# Patient Record
Sex: FEMALE | Race: WHITE | Employment: UNEMPLOYED | URBAN - METROPOLITAN AREA
[De-identification: names, ages, dates, MRNs, and addresses within clinical notes are randomized per-mention and may not be internally consistent; named-entity substitution may affect disease eponyms.]

---

## 2021-11-04 LAB — MAMMOGRAPHY, EXTERNAL: NORMAL

## 2022-04-18 PROBLEM — J30.9 ATOPIC RHINITIS: Status: ACTIVE | Noted: 2017-02-07

## 2022-04-18 PROBLEM — I10 HYPERTENSION: Status: ACTIVE | Noted: 2017-02-07

## 2022-04-18 PROBLEM — R74.01 ELEVATED ALANINE AMINOTRANSFERASE (ALT) LEVEL: Status: ACTIVE | Noted: 2017-02-07

## 2022-04-18 PROBLEM — E11.9 DIABETES MELLITUS (HCC): Status: ACTIVE | Noted: 2017-02-07

## 2022-04-18 PROBLEM — F41.1 GENERALIZED ANXIETY DISORDER: Status: ACTIVE | Noted: 2017-02-07

## 2022-04-18 PROBLEM — M76.61 ACHILLES TENDINITIS OF RIGHT LOWER EXTREMITY: Status: ACTIVE | Noted: 2021-08-20

## 2022-04-18 PROBLEM — E03.9 HYPOTHYROIDISM: Status: ACTIVE | Noted: 2017-02-07

## 2022-04-18 PROBLEM — E78.5 DYSLIPIDEMIA: Status: ACTIVE | Noted: 2017-02-07

## 2022-11-21 ENCOUNTER — APPOINTMENT (OUTPATIENT)
Dept: CT IMAGING | Age: 62
End: 2022-11-21
Payer: COMMERCIAL

## 2022-11-21 ENCOUNTER — HOSPITAL ENCOUNTER (EMERGENCY)
Age: 62
Discharge: HOME | End: 2022-11-21
Attending: EMERGENCY MEDICINE
Payer: COMMERCIAL

## 2022-11-21 VITALS
HEIGHT: 63 IN | BODY MASS INDEX: 42.7 KG/M2 | HEART RATE: 70 BPM | OXYGEN SATURATION: 96 % | WEIGHT: 241 LBS | SYSTOLIC BLOOD PRESSURE: 159 MMHG | RESPIRATION RATE: 18 BRPM | TEMPERATURE: 97.8 F | DIASTOLIC BLOOD PRESSURE: 82 MMHG

## 2022-11-21 DIAGNOSIS — R10.9 ABDOMINAL PAIN, UNSPECIFIED ABDOMINAL LOCATION: Primary | ICD-10-CM

## 2022-11-21 LAB
ABSOLUTE EOS #: 0.3 K/UL (ref 0–0.4)
ABSOLUTE LYMPH #: 3.4 K/UL (ref 1–4.8)
ABSOLUTE MONO #: 0.7 K/UL (ref 0.1–1.3)
ALBUMIN SERPL-MCNC: 4.3 G/DL (ref 3.5–5.2)
ALP BLD-CCNC: 59 U/L (ref 35–104)
ALT SERPL-CCNC: 62 U/L (ref 5–33)
ANION GAP SERPL CALCULATED.3IONS-SCNC: 12 MMOL/L (ref 9–17)
AST SERPL-CCNC: 46 U/L
BASOPHILS # BLD: 1 % (ref 0–2)
BASOPHILS ABSOLUTE: 0.1 K/UL (ref 0–0.2)
BILIRUB SERPL-MCNC: 0.3 MG/DL (ref 0.3–1.2)
BILIRUBIN URINE: NEGATIVE
BUN BLDV-MCNC: 19 MG/DL (ref 8–23)
CALCIUM SERPL-MCNC: 9.8 MG/DL (ref 8.6–10.4)
CHLORIDE BLD-SCNC: 104 MMOL/L (ref 98–107)
CO2: 23 MMOL/L (ref 20–31)
COLOR: YELLOW
COMMENT UA: NORMAL
CREAT SERPL-MCNC: 0.95 MG/DL (ref 0.5–0.9)
EOSINOPHILS RELATIVE PERCENT: 2 % (ref 0–4)
GFR SERPL CREATININE-BSD FRML MDRD: >60 ML/MIN/1.73M2
GLUCOSE BLD-MCNC: 116 MG/DL (ref 70–99)
GLUCOSE URINE: NEGATIVE
HCT VFR BLD CALC: 42.1 % (ref 36–46)
HEMOGLOBIN: 13.8 G/DL (ref 12–16)
KETONES, URINE: NEGATIVE
LEUKOCYTE ESTERASE, URINE: NEGATIVE
LIPASE: 18 U/L (ref 13–60)
LYMPHOCYTES # BLD: 28 % (ref 24–44)
MCH RBC QN AUTO: 29.2 PG (ref 26–34)
MCHC RBC AUTO-ENTMCNC: 32.8 G/DL (ref 31–37)
MCV RBC AUTO: 88.9 FL (ref 80–100)
MONOCYTES # BLD: 6 % (ref 1–7)
NITRITE, URINE: NEGATIVE
PDW BLD-RTO: 13.6 % (ref 11.5–14.9)
PH UA: 5 (ref 5–8)
PLATELET # BLD: 234 K/UL (ref 150–450)
PMV BLD AUTO: 8.8 FL (ref 6–12)
POTASSIUM SERPL-SCNC: 4.3 MMOL/L (ref 3.7–5.3)
PROTEIN UA: NEGATIVE
RBC # BLD: 4.74 M/UL (ref 4–5.2)
SEG NEUTROPHILS: 63 % (ref 36–66)
SEGMENTED NEUTROPHILS ABSOLUTE COUNT: 7.8 K/UL (ref 1.3–9.1)
SODIUM BLD-SCNC: 139 MMOL/L (ref 135–144)
SPECIFIC GRAVITY UA: 1.02 (ref 1–1.03)
TOTAL PROTEIN: 7.5 G/DL (ref 6.4–8.3)
TURBIDITY: CLEAR
URINE HGB: NEGATIVE
UROBILINOGEN, URINE: NORMAL
WBC # BLD: 12.3 K/UL (ref 3.5–11)

## 2022-11-21 PROCEDURE — 96374 THER/PROPH/DIAG INJ IV PUSH: CPT

## 2022-11-21 PROCEDURE — 80053 COMPREHEN METABOLIC PANEL: CPT

## 2022-11-21 PROCEDURE — 6360000002 HC RX W HCPCS: Performed by: EMERGENCY MEDICINE

## 2022-11-21 PROCEDURE — 81003 URINALYSIS AUTO W/O SCOPE: CPT

## 2022-11-21 PROCEDURE — 85025 COMPLETE CBC W/AUTO DIFF WBC: CPT

## 2022-11-21 PROCEDURE — 74177 CT ABD & PELVIS W/CONTRAST: CPT

## 2022-11-21 PROCEDURE — 93005 ELECTROCARDIOGRAM TRACING: CPT | Performed by: EMERGENCY MEDICINE

## 2022-11-21 PROCEDURE — 6360000002 HC RX W HCPCS

## 2022-11-21 PROCEDURE — 36415 COLL VENOUS BLD VENIPUNCTURE: CPT

## 2022-11-21 PROCEDURE — 6360000004 HC RX CONTRAST MEDICATION: Performed by: EMERGENCY MEDICINE

## 2022-11-21 PROCEDURE — 83690 ASSAY OF LIPASE: CPT

## 2022-11-21 PROCEDURE — 99285 EMERGENCY DEPT VISIT HI MDM: CPT

## 2022-11-21 PROCEDURE — 96375 TX/PRO/DX INJ NEW DRUG ADDON: CPT

## 2022-11-21 PROCEDURE — 2580000003 HC RX 258: Performed by: EMERGENCY MEDICINE

## 2022-11-21 PROCEDURE — 96372 THER/PROPH/DIAG INJ SC/IM: CPT

## 2022-11-21 RX ORDER — 0.9 % SODIUM CHLORIDE 0.9 %
1000 INTRAVENOUS SOLUTION INTRAVENOUS ONCE
Status: COMPLETED | OUTPATIENT
Start: 2022-11-21 | End: 2022-11-21

## 2022-11-21 RX ORDER — SODIUM CHLORIDE 0.9 % (FLUSH) 0.9 %
10 SYRINGE (ML) INJECTION PRN
Status: DISCONTINUED | OUTPATIENT
Start: 2022-11-21 | End: 2022-11-21 | Stop reason: HOSPADM

## 2022-11-21 RX ORDER — ONDANSETRON 2 MG/ML
4 INJECTION INTRAMUSCULAR; INTRAVENOUS ONCE
Status: COMPLETED | OUTPATIENT
Start: 2022-11-21 | End: 2022-11-21

## 2022-11-21 RX ORDER — POLYETHYLENE GLYCOL 3350 17 G/17G
17 POWDER, FOR SOLUTION ORAL DAILY
Qty: 510 G | Refills: 0 | Status: SHIPPED | OUTPATIENT
Start: 2022-11-21 | End: 2022-12-21

## 2022-11-21 RX ORDER — ONDANSETRON 2 MG/ML
INJECTION INTRAMUSCULAR; INTRAVENOUS
Status: COMPLETED
Start: 2022-11-21 | End: 2022-11-21

## 2022-11-21 RX ORDER — DICYCLOMINE HCL 20 MG
20 TABLET ORAL 4 TIMES DAILY
Qty: 28 TABLET | Refills: 0 | Status: SHIPPED | OUTPATIENT
Start: 2022-11-21 | End: 2022-11-28

## 2022-11-21 RX ORDER — MORPHINE SULFATE 4 MG/ML
4 INJECTION, SOLUTION INTRAMUSCULAR; INTRAVENOUS ONCE
Status: COMPLETED | OUTPATIENT
Start: 2022-11-21 | End: 2022-11-21

## 2022-11-21 RX ORDER — 0.9 % SODIUM CHLORIDE 0.9 %
100 INTRAVENOUS SOLUTION INTRAVENOUS ONCE
Status: COMPLETED | OUTPATIENT
Start: 2022-11-21 | End: 2022-11-21

## 2022-11-21 RX ORDER — DICYCLOMINE HYDROCHLORIDE 10 MG/ML
20 INJECTION INTRAMUSCULAR ONCE
Status: COMPLETED | OUTPATIENT
Start: 2022-11-21 | End: 2022-11-21

## 2022-11-21 RX ADMIN — SODIUM CHLORIDE 100 ML: 9 INJECTION, SOLUTION INTRAVENOUS at 11:40

## 2022-11-21 RX ADMIN — DICYCLOMINE HYDROCHLORIDE 20 MG: 20 INJECTION, SOLUTION INTRAMUSCULAR at 13:01

## 2022-11-21 RX ADMIN — IOPAMIDOL 75 ML: 755 INJECTION, SOLUTION INTRAVENOUS at 11:39

## 2022-11-21 RX ADMIN — SODIUM CHLORIDE, PRESERVATIVE FREE 10 ML: 5 INJECTION INTRAVENOUS at 11:40

## 2022-11-21 RX ADMIN — ONDANSETRON 4 MG: 2 INJECTION INTRAMUSCULAR; INTRAVENOUS at 11:06

## 2022-11-21 RX ADMIN — SODIUM CHLORIDE 1000 ML: 9 INJECTION, SOLUTION INTRAVENOUS at 11:55

## 2022-11-21 RX ADMIN — MORPHINE SULFATE 4 MG: 4 INJECTION, SOLUTION INTRAMUSCULAR; INTRAVENOUS at 11:00

## 2022-11-21 ASSESSMENT — PAIN SCALES - GENERAL
PAINLEVEL_OUTOF10: 7
PAINLEVEL_OUTOF10: 4
PAINLEVEL_OUTOF10: 9
PAINLEVEL_OUTOF10: 4
PAINLEVEL_OUTOF10: 8

## 2022-11-21 ASSESSMENT — PAIN DESCRIPTION - LOCATION: LOCATION: ABDOMEN

## 2022-11-21 ASSESSMENT — PAIN - FUNCTIONAL ASSESSMENT: PAIN_FUNCTIONAL_ASSESSMENT: 0-10

## 2022-11-21 NOTE — ED TRIAGE NOTES
Patient to emergency department with complaints of lower abdominal pain and diarrhea which started 2-3 weeks ago. States she followed up with her PCP last Monday was prescribed 2 antibiotics and is supposed to follow up tomorrow but states the pain is to severe. Reports not change with medications. Pt ambulatory in triage.

## 2022-11-21 NOTE — ED PROVIDER NOTES
[address] ED  eMERGENCY dEPARTMENT eNCOUnter    Pt Name: Mirian Gregorio  MRN: [id number]  [place] 1960  Date of evaluation: 11/21/22  CHIEF COMPLAINT       Chief Complaint   Patient presents with    Abdominal Pain    Diarrhea     HISTORY OF PRESENT ILLNESS   HPI  Patient presents with complaints of abdominal pain that she has had over the past 2 to 3 weeks. Patient complains of diarrhea. Patient does complain of nausea and \"indigestion\". No vomiting. No fevers no chills. No chest pain or shortness of breath. No dysuria. Pain is episodic worse with food. Pain is primarily at the right lower quadrant. Patient was seen by her PCP who presumed diverticulitis and started the patient on ciprofloxacin and Flagyl. REVIEW OF SYSTEMS     Constitutional: No fever  Eye: No visual changes  Ear/Nose/Mouth/Throat: No sore throat  Respiratory: No shortness of breath  Cardiovascular: No chest pain  Gastrointestinal: As above  Genitourinary: No dysuria  Musculoskeletal: No joint pain  Integumentary: No rash  Neurologic: No dizziness  Psychiatric: No anxiety, no depression  All systems otherwise negative.         PAST MEDICAL HISTORY     Past Medical History:   Diagnosis Date    Type 2 diabetes mellitus ([facility] [address] 2002     SURGICAL HISTORY       Past Surgical History:   Procedure Laterality Date    PARTIAL HYSTERECTOMY (CERVIX NOT REMOVED) N/A 2006    TONSILLECTOMY       CURRENT MEDICATIONS       Previous Medications    CIPROFLOXACIN (CIPRO) 500 MG TABLET    Take 1 tablet by mouth 2 times daily for 7 days    EUTHYROX 50 MCG TABLET    Take 1 tablet by mouth daily    INSULIN 70-30 (NOVOLIN 70/30) (70-30) 100 UNIT PER ML INJECTION VIAL    Inject 50 Units into the skin 2 times daily    LISINOPRIL (PRINIVIL;ZESTRIL) 10 MG TABLET    Take 1 tablet by mouth daily    METFORMIN (GLUCOPHAGE-XR) 500 MG EXTENDED RELEASE TABLET    Take 2 tablets by mouth in the morning and at bedtime    METRONIDAZOLE (FLAGYL) 500 MG TABLET    Take 1 tablet by mouth 2 times daily for 7 days     ALLERGIES     is allergic to aspirin, meloxicam, and penicillins. FAMILY HISTORY     has no family status information on file. SOCIAL HISTORY      reports that she has never smoked. She has never used smokeless tobacco. She reports current alcohol use. She reports that she does not use drugs. PHYSICAL EXAM     INITIAL VITALS: /66   Pulse 74   Temp 97.8 °F (36.6 °C) (Oral)   Resp 18   Ht 5' 3\" (1.6 m)   Wt 241 lb (109.3 kg)   SpO2 96%   BMI 42.69 kg/m²      General: NAD  Head: Normocephalic  Eyes: No scleral icterus  ENT: dry mucus membranes  Neck: Supple  Lungs: Clear to ascultation bilaterally, no wheeze, no rales, no rhonchi  Cardiac: Regular rate and rhythm, S1/S2, no murmurs  Abdominal: Soft, nondistended, positive right lower quadrant tenderness, no rebound, no guarding. Positive right CVA tenderness  Extremities: No edema  Rectal: Deferred  Genitourinary: Deferred  Skin: No rash  Neuro: AOx4, no decreased LOC  Psych: No anxiety  Perfusion: Warm x 4      MEDICAL DECISION MAKING:   MDM    Patient presents with complaints of abdominal pain x3 weeks. Positive diarrhea. Positive nausea. On exam tender to the right lower quadrant. Normal limits. CT abdomen pelvis obtained and shows no acute process  Labs obtained. I reviewed the labs and they are within normal limits. CT abdomen pelvis showed no acute process. I reviewed the scan myself and am concerned for positive constipation. Patient's pain presently improved. Patient is tolerating p.o.'s.  Patient will be started on Bentyl and MiraLAX. Patient will be discharged home. She has follow-up with her primary care doctor tomorrow.       CRITICAL CARE:     PROCEDURES:    Procedures    DIAGNOSTIC RESULTS   EKG: All EKG's are interpreted by the Emergency Department Physician who either signs or Co-signs this chart in the absence of a cardiologist.      RADIOLOGY:All plain film, CT, MRI, and formal ultrasound images (except ED bedside ultrasound) are read by the radiologist, see reports below, unless otherwise noted in MDM or here. CT ABDOMEN PELVIS W IV CONTRAST Additional Contrast? None   Final Result   1. Small sliding hiatal hernia. 2. Sigmoid diverticulosis. 3. 7 mm fat density nodule right kidney compatible with angiomyolipoma. 4. No acute infective or inflammatory process. LABS: All lab results were reviewed by myself, and all abnormals are listed below.   Labs Reviewed   CBC WITH AUTO DIFFERENTIAL - Abnormal; Notable for the following components:       Result Value    WBC 12.3 (*)     All other components within normal limits   COMPREHENSIVE METABOLIC PANEL - Abnormal; Notable for the following components:    Glucose 116 (*)     Creatinine 0.95 (*)     ALT 62 (*)     AST 46 (*)     All other components within normal limits   URINALYSIS WITH REFLEX TO CULTURE   LIPASE   URINALYSIS     EMERGENCY DEPARTMENT COURSE:   Vitals:    Vitals:    11/21/22 1004 11/21/22 1154   BP: (!) 160/71 138/66   Pulse: 83 74   Resp: 18    Temp: 97.8 °F (36.6 °C)    TempSrc: Oral    SpO2: 98% 96%   Weight: 241 lb (109.3 kg)    Height: 5' 3\" (1.6 m)        The patient was given the following medications while in the emergency department:  Orders Placed This Encounter   Medications    morphine sulfate (PF) injection 4 mg    ondansetron (ZOFRAN) 4 MG/2ML injection     Mckayla Tovar: cabinet override    ondansetron (ZOFRAN) injection 4 mg    iopamidol (ISOVUE-370) 76 % injection 75 mL    sodium chloride flush 0.9 % injection 10 mL    0.9 % sodium chloride bolus    0.9 % sodium chloride bolus    dicyclomine (BENTYL) injection 20 mg    dicyclomine (BENTYL) 20 MG tablet     Sig: Take 1 tablet by mouth 4 times daily for 7 days     Dispense:  28 tablet     Refill:  0    polyethylene glycol (GLYCOLAX) 17 GM/SCOOP powder     Sig: Take 17 g by mouth daily     Dispense:  510 g     Refill:  0 CONSULTS:  None    FINAL IMPRESSION      1. Abdominal pain, unspecified abdominal location          DISPOSITION/PLAN   DISPOSITION Decision To Discharge 11/21/2022 01:06:39 PM      PATIENT REFERRED TO:  Taurus Alvarado, [address]   [address] [facility]  [phone]        DISCHARGE MEDICATIONS:  New Prescriptions    DICYCLOMINE (BENTYL) 20 MG TABLET    Take 1 tablet by mouth 4 times daily for 7 days    POLYETHYLENE GLYCOL (GLYCOLAX) 17 GM/SCOOP POWDER    Take 17 g by mouth daily     Uma Painting MD  Attending Emergency Physician  [employer] voice recognition software used in portions of this document.                     Uma Painting MD  11/21/22 3731

## 2022-11-22 LAB
EKG ATRIAL RATE: 75 BPM
EKG P AXIS: 53 DEGREES
EKG P-R INTERVAL: 162 MS
EKG Q-T INTERVAL: 420 MS
EKG QRS DURATION: 104 MS
EKG QTC CALCULATION (BAZETT): 469 MS
EKG R AXIS: -36 DEGREES
EKG T AXIS: 19 DEGREES
EKG VENTRICULAR RATE: 75 BPM

## 2022-11-22 PROCEDURE — 93010 ELECTROCARDIOGRAM REPORT: CPT | Performed by: INTERNAL MEDICINE

## 2023-02-05 ENCOUNTER — OFFICE VISIT (OUTPATIENT)
Dept: FAMILY MEDICINE CLINIC | Age: 63
End: 2023-02-05
Payer: COMMERCIAL

## 2023-02-05 VITALS
WEIGHT: 240 LBS | SYSTOLIC BLOOD PRESSURE: 149 MMHG | HEIGHT: 63 IN | OXYGEN SATURATION: 98 % | DIASTOLIC BLOOD PRESSURE: 80 MMHG | TEMPERATURE: 97.8 F | BODY MASS INDEX: 42.52 KG/M2 | HEART RATE: 82 BPM

## 2023-02-05 DIAGNOSIS — B96.89 ACUTE BACTERIAL SINUSITIS: Primary | ICD-10-CM

## 2023-02-05 DIAGNOSIS — R05.1 ACUTE COUGH: ICD-10-CM

## 2023-02-05 DIAGNOSIS — J01.90 ACUTE BACTERIAL SINUSITIS: Primary | ICD-10-CM

## 2023-02-05 PROCEDURE — 3079F DIAST BP 80-89 MM HG: CPT

## 2023-02-05 PROCEDURE — 3077F SYST BP >= 140 MM HG: CPT

## 2023-02-05 PROCEDURE — 99213 OFFICE O/P EST LOW 20 MIN: CPT

## 2023-02-05 RX ORDER — BENZONATATE 200 MG/1
200 CAPSULE ORAL 3 TIMES DAILY PRN
Qty: 21 CAPSULE | Refills: 0 | Status: SHIPPED | OUTPATIENT
Start: 2023-02-05 | End: 2023-02-12

## 2023-02-05 RX ORDER — AZITHROMYCIN 250 MG/1
250 TABLET, FILM COATED ORAL SEE ADMIN INSTRUCTIONS
Qty: 6 TABLET | Refills: 0 | Status: SHIPPED | OUTPATIENT
Start: 2023-02-05 | End: 2023-02-10

## 2023-02-05 ASSESSMENT — ENCOUNTER SYMPTOMS
COUGH: 1
SORE THROAT: 1
HOARSE VOICE: 1
CHEST TIGHTNESS: 0
DIARRHEA: 0
SHORTNESS OF BREATH: 0
SINUS PRESSURE: 1
CONSTIPATION: 0
RHINORRHEA: 1
SINUS COMPLAINT: 1

## 2023-02-05 NOTE — PROGRESS NOTES
[facility] PHYSICIANS [facility], [facility] WALK-IN FAMILY MEDICINE  [address]  [place] [address]  Dept: [phone]  Dept Fax: [phone]    Jennifer Ross is a 63 y.o. female who presents to the urgent care today for her medical conditions/complaints as notedbelow.  Jennifer Ross is c/o of Cough (Clear mucus when coughing up), Pharyngitis, and Sinus Problem (Runny nose all sx started wednesday)      HPI:     Cough  This is a new problem. The current episode started in the past 7 days. The problem has been gradually worsening. The problem occurs constantly. The cough is Productive of sputum. Associated symptoms include ear congestion, headaches, nasal congestion, postnasal drip, rhinorrhea and a sore throat. Pertinent negatives include no chest pain, chills, ear pain, fever, rash or shortness of breath. She has tried OTC cough suppressant and rest for the symptoms. The treatment provided mild relief. There is no history of asthma, bronchitis, COPD or environmental allergies.   Sinus Problem  This is a new problem. The current episode started in the past 7 days. The problem has been gradually worsening since onset. There has been no fever. Associated symptoms include congestion, coughing, headaches, a hoarse voice, sinus pressure and a sore throat. Pertinent negatives include no chills, ear pain, neck pain or shortness of breath. Treatments tried: OTC Tx. The treatment provided no relief.     Past Medical History:   Diagnosis Date    Type 2 diabetes mellitus (HCC) 2002        Current Outpatient Medications   Medication Sig Dispense Refill    azithromycin (ZITHROMAX) 250 MG tablet Take 1 tablet by mouth See Admin Instructions for 5 days 500mg on day 1 followed by 250mg on days 2 - 5 6 tablet 0    benzonatate (TESSALON) 200 MG capsule Take 1 capsule by mouth 3 times daily as needed for Cough 21 capsule 0    pravastatin (PRAVACHOL) 20 MG tablet TAKE 1 TABLET BY MOUTH  NIGHTLY      dicyclomine (BENTYL) 20 MG tablet Take 1 tablet by mouth 4 times daily for 7 days 28 tablet 0    insulin 70-30 (NOVOLIN 70/30) (70-30) 100 UNIT per ML injection vial Inject 50 Units into the skin 2 times daily 1 each 3    EUTHYROX 50 MCG tablet Take 1 tablet by mouth daily 30 tablet 11    lisinopril (PRINIVIL;ZESTRIL) 10 MG tablet Take 1 tablet by mouth daily 30 tablet 11    metFORMIN (GLUCOPHAGE-XR) 500 MG extended release tablet Take 2 tablets by mouth in the morning and at bedtime 120 tablet 11     No current facility-administered medications for this visit. Allergies   Allergen Reactions    Aspirin Other (See Comments) and Nausea And Vomiting     \"jitteriness\"    Meloxicam Dizziness or Vertigo and Headaches    Penicillins Hives       Subjective:      Review of Systems   Constitutional:  Negative for chills and fever. HENT:  Positive for congestion, hoarse voice, postnasal drip, rhinorrhea, sinus pressure and sore throat. Negative for ear pain. Respiratory:  Positive for cough. Negative for chest tightness and shortness of breath. Cardiovascular:  Negative for chest pain and leg swelling. Gastrointestinal:  Negative for constipation and diarrhea. Musculoskeletal:  Negative for neck pain. Skin:  Negative for rash. Allergic/Immunologic: Negative for environmental allergies. Neurological:  Positive for headaches. Negative for dizziness. All other systems reviewed and are negative. 14 systems reviewed and negative except as listed in HPI. Objective:     Physical Exam  Vitals reviewed. Constitutional:       General: She is not in acute distress. Appearance: She is obese. She is ill-appearing. She is not toxic-appearing or diaphoretic. HENT:      Head: Normocephalic and atraumatic. Right Ear: External ear normal. Tenderness present. No drainage or swelling. A middle ear effusion is present. There is no impacted cerumen.  Tympanic membrane is not injected, perforated or erythematous. Left Ear: External ear normal. Tenderness present. No drainage or swelling. A middle ear effusion is present. There is no impacted cerumen. Tympanic membrane is not injected, perforated or erythematous. Nose: Nasal tenderness, congestion and rhinorrhea present. Rhinorrhea is purulent. Right Sinus: Frontal sinus tenderness present. No maxillary sinus tenderness. Left Sinus: Frontal sinus tenderness present. No maxillary sinus tenderness. Mouth/Throat:      Lips: Pink. Mouth: Mucous membranes are moist.      Pharynx: Oropharyngeal exudate and posterior oropharyngeal erythema present. No pharyngeal swelling. Comments: PND  Eyes:      General:         Right eye: No discharge. Left eye: No discharge. Extraocular Movements: Extraocular movements intact. Conjunctiva/sclera: Conjunctivae normal.      Pupils: Pupils are equal, round, and reactive to light. Cardiovascular:      Rate and Rhythm: Normal rate and regular rhythm. Heart sounds: Normal heart sounds. Pulmonary:      Effort: Pulmonary effort is normal. No respiratory distress. Breath sounds: Normal breath sounds. No stridor. No wheezing, rhonchi or rales. Chest:      Chest wall: No tenderness. Musculoskeletal:         General: No swelling or tenderness. Normal range of motion. Cervical back: Normal range of motion and neck supple. No rigidity or tenderness. Lymphadenopathy:      Cervical: Cervical adenopathy present. Skin:     General: Skin is warm and dry. Capillary Refill: Capillary refill takes less than 2 seconds. Findings: No erythema or rash. Neurological:      Mental Status: She is alert and oriented to person, place, and time. Motor: No weakness. Coordination: Coordination normal.      Gait: Gait normal.   Psychiatric:         Mood and Affect: Mood normal.         Behavior: Behavior normal.         Thought Content:  Thought content normal. Judgment: Judgment normal.     BP (!) 149/80   Pulse 82   Temp 97.8 °F (36.6 °C)   Ht 5' 3\" (1.6 m)   Wt 240 lb (108.9 kg)   SpO2 98%   BMI 42.51 kg/m²     Assessment:       Diagnosis Orders   1. Acute bacterial sinusitis  azithromycin (ZITHROMAX) 250 MG tablet      2. Acute cough  benzonatate (TESSALON) 200 MG capsule          Plan:   -Based on the duration and severity of the symptoms-- I will treat this as bacterial at this time.  -Patient instructed to complete antibiotic prescription fully. -May use Motrin/Tylenol for fever/pain.  -Saline washes, salt water gargles and over the counter preparations if desired.  -Instructed to increase fluid intake.   -Suggested humidifier and mist therapy.  -Avoid irritants such as smoke. -Tessalon for cough  -Patient agreeable to treatment plan.  -Educational materials provided on AVS.    Return if symptoms worsen or fail to improve. Orders Placed This Encounter   Medications    azithromycin (ZITHROMAX) 250 MG tablet     Sig: Take 1 tablet by mouth See Admin Instructions for 5 days 500mg on day 1 followed by 250mg on days 2 - 5     Dispense:  6 tablet     Refill:  0    benzonatate (TESSALON) 200 MG capsule     Sig: Take 1 capsule by mouth 3 times daily as needed for Cough     Dispense:  21 capsule     Refill:  0         Patient given educational materials - see patient instructions. Discussed use, benefit, and side effects of prescribed medications. All patient questions answered. Pt voiced understanding.     Electronically signed by CARLOTTA Fuentes NP on 2/5/2023 at 10:55 AM

## 2023-05-11 ENCOUNTER — OFFICE VISIT (OUTPATIENT)
Dept: PODIATRY | Age: 63
End: 2023-05-11
Payer: COMMERCIAL

## 2023-05-11 VITALS — WEIGHT: 241 LBS | BODY MASS INDEX: 42.7 KG/M2 | HEIGHT: 63 IN

## 2023-05-11 DIAGNOSIS — M79.672 PAIN IN LEFT FOOT: ICD-10-CM

## 2023-05-11 DIAGNOSIS — R60.0 EDEMA, LOWER EXTREMITY: ICD-10-CM

## 2023-05-11 DIAGNOSIS — M24.572 EQUINUS CONTRACTURE OF LEFT ANKLE: ICD-10-CM

## 2023-05-11 DIAGNOSIS — M76.62 ACHILLES TENDINITIS OF LEFT LOWER EXTREMITY: Primary | ICD-10-CM

## 2023-05-11 PROCEDURE — 99203 OFFICE O/P NEW LOW 30 MIN: CPT | Performed by: PODIATRIST

## 2023-05-11 ASSESSMENT — ENCOUNTER SYMPTOMS
SHORTNESS OF BREATH: 0
COLOR CHANGE: 0
DIARRHEA: 0
NAUSEA: 0
BACK PAIN: 0

## 2023-05-11 NOTE — PROGRESS NOTES
metatarsal head without abduction of the hallux of the right foot. There is no prominence noted to the first metatarsal head without abduction of the hallux of the left foot. There is pain with palpation to the posterior aspect of the left heel at the insertion of the Achilles tendon. There is pain with palpation to the watershed region of the Achilles tendon left lower extremity. There is no crepitus noted with this palpation. There is no palpable defect noted to the left Achilles tendon. Shoe examination was performed. Biomechanical Exam: normal bilaterally. Asessment: Patient is a 61 y.o. female with:    Diagnosis Orders   1. Achilles tendinitis of left lower extremity  Ambulatory referral to Physical Therapy      2. Edema, lower extremity  Ambulatory referral to Physical Therapy      3. Equinus contracture of left ankle  Ambulatory referral to Physical Therapy      4. Pain in left foot  Ambulatory referral to Physical Therapy          Plan:  1. Clinical evaluation of the patient. 2.  Patient given an order for physical therapy for evaluation and treatment of Achilles tendinitis and equinus left. I dispensed a pair of 6 mm heel lifts of the patient with instructions on proper use. Patient is to inform the physical therapist that she is using these heel lifts so that she can eventually be weaned off of them. 3. Contact office with any questions/problems/concerns. Return in about 6 weeks (around 6/22/2023) for Evaluation of Achilles tendinitis and equinus left.    5/11/2023      Richard Barry DPM

## 2023-05-23 ENCOUNTER — HOSPITAL ENCOUNTER (OUTPATIENT)
Dept: PHYSICAL THERAPY | Age: 63
Setting detail: THERAPIES SERIES
Discharge: HOME | End: 2023-05-23
Payer: COMMERCIAL

## 2023-05-23 PROCEDURE — 97110 THERAPEUTIC EXERCISES: CPT

## 2023-05-23 PROCEDURE — 97161 PT EVAL LOW COMPLEX 20 MIN: CPT

## 2023-05-23 PROCEDURE — 97016 VASOPNEUMATIC DEVICE THERAPY: CPT

## 2023-05-23 NOTE — THERAPY EVALUATION
record [] None [] Other:      Pain:   Pain present? y   Location L lateral posterior ankle    Pain Rating currently 8/10   Pain at worse 8/10 at worst    Pain at best 2/10 - when sitting and elevated    Description of pain Burning, sharp    Altered Sensation None    What makes it worse Walking, movement, worst in morning with initial steps    What makes it better Rest  & elevation, icing    Symptom progression Same - stable    Sleep Not disturbed        Work Status: not working     Prior Level of Function:  Prior to ankle pain -- able to do treadmill for 1 hour prior, now can barely tolerate       Functional Status Previous level of function Current level of function Comments   Sitting [x] Independent  [] Deficit [x] Independent  [] Deficit    Standing/walking [x] Independent  [] Deficit [x] Independent  [] Deficit Pain with walking    Driving [x] Independent  [] Deficit [x] Independent  [] Deficit    Housekeeping/Meal Preparation [x] Independent  [] Deficit [x] Independent  [] Deficit    Stair climbing [x] Independent  [] Deficit [] Independent  [x] Deficit Pain    Pivoting [x] Independent  [] Deficit [x] Independent  [] Deficit    Squatting [x] Independent  [] Deficit [] Independent  [x] Deficit Pain    Other [] Independent  [] Deficit [] Independent  [] Deficit      Patient Goals/Rehab Expectations: to feel better      Objective:      Observations:   OBSERVATION No Deficit Deficit Comments   Posture          Pronation []  [x]   Slight increase in pronation on LLE vs RLE noted in standing   Supination [x]  []      Leg Length Discrp [x]  []      Edema  x Left lateral/posterior ankle   Palpation  x Tightness/tenderness throughout achilles (lateral>medial)   Joint Mobility   x Hypomobility noted in left ankle, most significantly anterior/posterior   Gait  x  No outward deficits but painful        Lumbar Clearing:  [x] Not tested            [] No Deficit              [] Deficit:      Sensation:  [x] No Deficit

## 2023-05-25 ENCOUNTER — HOSPITAL ENCOUNTER (OUTPATIENT)
Dept: PHYSICAL THERAPY | Age: 63
Setting detail: THERAPIES SERIES
Discharge: HOME | End: 2023-05-25
Payer: COMMERCIAL

## 2023-05-25 PROCEDURE — 97140 MANUAL THERAPY 1/> REGIONS: CPT

## 2023-05-25 PROCEDURE — 97110 THERAPEUTIC EXERCISES: CPT

## 2023-05-25 PROCEDURE — 97016 VASOPNEUMATIC DEVICE THERAPY: CPT

## 2023-05-25 NOTE — FLOWSHEET NOTE
[x] [facility] - [facility] & Therapy  [address]  [place]: [phone]   F: [phone]    Physical Therapy Daily Treatment Note    Date:  2023  Patient Name:  Vidhi Valenzuela    :  1960  MRN: [id number]  Physician: Abiel Bishop DPM      Insurance: [facility]   Medical Diagnosis:   M76.62 (ICD-10-CM) - Achilles tendinitis of left lower extremity   R60.0 (ICD-10-CM) - Edema, lower extremity   M24.572 (ICD-10-CM) - Equinus contracture of left ankle   M79.672 (ICD-10-CM) - Pain in left foot   Rehab Codes: R25 pain , M25.60 stiffness, R53.1 weakness   Onset Date: 2023    Next [address]. [address]: 23  Visit# / total visits:       Cancels/No Shows: 0/0    Subjective:  Pt reports that she has been having slight decrease in pain since her initial evaluation and that she has been performing her home exercises 3x a day and that she notes no increase in pain during performance of exercises. Pt states that she continues to have burning sensation at night and with the first few steps in the morning. Pt states that she has been using a cold pack for temporary pain relief. Continued difficulty with extended periods of walking. Pt states that her son built her a step to perform her home exercises on.      Pain:  [x] Yes  [] No Location: Lateral/posterior right ankle/achilles   Pain Rating: (0-10 scale) 6/10    Comments:    Objective:    Precautions: none    INTERVENTIONS  Reps/ Time Weight/ Level Completed  Today Comments               MODALITIES            Vasocompression - L ankle 10 min  Low; 34 deg  x Decreased pain post vaso               MANUAL            Massage stick to left gastroc/soleus/peroneals  8'    x  Increased tightness/tenderness present laterally               EXERCISES            Gastroc stretch @ step 3x30\"    x     Soleus stretch @ step 3x30\"    x     Eccentric heel raises x10   x 2 up 1 down    Towel stretch-seated 3x20\"   x     Seated heel slides 10x5\"

## 2023-05-30 ENCOUNTER — HOSPITAL ENCOUNTER (OUTPATIENT)
Dept: PHYSICAL THERAPY | Age: 63
Setting detail: THERAPIES SERIES
Discharge: HOME | End: 2023-05-30
Payer: COMMERCIAL

## 2023-05-30 PROCEDURE — 97016 VASOPNEUMATIC DEVICE THERAPY: CPT

## 2023-05-30 PROCEDURE — 97110 THERAPEUTIC EXERCISES: CPT

## 2023-05-30 PROCEDURE — 97140 MANUAL THERAPY 1/> REGIONS: CPT

## 2023-05-30 NOTE — FLOWSHEET NOTE
short term goals. [x] Specific Instructions for subsequent treatments: Continue to work on improving left ankle strength, stability, muscle endurance, and ROM in order to improve tolerance to daily activities.     Treatment Charges: Mins Units   []  Modalities     [x]  Ther Exercise 35 2   []  Manual Therapy 8 1   []  Ther Activities     [x]  Neuro Re-ed     [x]  Vasocompression 10 1   [] Gait     []  Other     Total Treatment time 53 4       Time In: 11:15 a           Time Out: 12:08 pm     Electronically signed by:  Evelin Camejo PTA

## 2023-06-01 ENCOUNTER — HOSPITAL ENCOUNTER (OUTPATIENT)
Dept: PHYSICAL THERAPY | Age: 63
Setting detail: THERAPIES SERIES
Discharge: HOME | End: 2023-06-01
Payer: COMMERCIAL

## 2023-06-01 PROCEDURE — 97140 MANUAL THERAPY 1/> REGIONS: CPT

## 2023-06-01 PROCEDURE — 97016 VASOPNEUMATIC DEVICE THERAPY: CPT

## 2023-06-01 PROCEDURE — 97110 THERAPEUTIC EXERCISES: CPT

## 2023-06-01 NOTE — FLOWSHEET NOTE
[x] [facility]) - [facility] & Therapy  [address]  [place]: [phone]   F: [phone]    Physical Therapy Daily Treatment Note    Date:  2023  Patient Name:  Linda Barlow    :  1960  MRN: [id number]  Physician: Richard Barry DPM      Insurance: [id number] Diagnosis:   M76.62 (ICD-10-CM) - Achilles tendinitis of left lower extremity   R60.0 (ICD-10-CM) - Edema, lower extremity   M24.572 (ICD-10-CM) - Equinus contracture of left ankle   M79.672 (ICD-10-CM) - Pain in left foot   Rehab Codes: R25 pain , M25.60 stiffness, R53.1 weakness   Onset Date: 2023    Next [address]: [address]  Visit# / total visits:  - corrected      Cancels/No Shows: 0/0    Subjective:  Pt states minimal soreness from last session. Pain:  [x] Yes  [] No Location: Lateral/posterior right ankle/achilles   Pain Rating: (0-10 scale) 6/10    Comments:    Objective:    Precautions: none    INTERVENTIONS  Reps/ Time Weight/ Level Completed  Today Comments               MODALITIES            Vasocompression - L ankle 10 min  Low; 34 deg  x Decreased pain post vaso               MANUAL            Massage stick to left gastroc/soleus/peroneals  8'    x  Increased tightness/tenderness present laterally  Patient is in sitting at edge of bed and bed is elevated for easier access to tender area.                 EXERCISES            Gastroc stretch @ step 3x30\"    x     Soleus stretch @ step 3x30\"    x     SB calf stretch with toes IV/EV 3x30\"  x Added     Eccentric heel raises x10   x 2 up 1 down    Towel stretch-seated 3x20\"   x     Seated heel slides 10x5\"  x    Heel/toe raises 2x10  x    Tandem balance-L back 3x30\"    Appropriate balance reactions noted   Seated 4 way resistive AROM 10x2 ea red x Added 5/320            Other:      Assessment:   : Continued with most recent progressions with patient needing moderate cueing for proper exercise technique with continued

## 2023-06-06 ENCOUNTER — APPOINTMENT (OUTPATIENT)
Dept: PHYSICAL THERAPY | Age: 63
End: 2023-06-06
Payer: COMMERCIAL

## 2023-06-07 ENCOUNTER — HOSPITAL ENCOUNTER (OUTPATIENT)
Dept: PHYSICAL THERAPY | Age: 63
Setting detail: THERAPIES SERIES
Discharge: HOME | End: 2023-06-07
Payer: COMMERCIAL

## 2023-06-07 PROCEDURE — 97110 THERAPEUTIC EXERCISES: CPT

## 2023-06-07 PROCEDURE — 97016 VASOPNEUMATIC DEVICE THERAPY: CPT

## 2023-06-07 NOTE — FLOWSHEET NOTE
[x] [facility] ([facility]) - [facility] & Therapy  [address]  [place]: [phone]   F: [phone]    Physical Therapy Daily Treatment Note    Date:  2023  Patient Name:  Fermín Ann    :  1960  MRN: [id number]  Physician: Sanchez Cortez DPM      Insurance: [facility]   Medical Diagnosis:   M76.62 (ICD-10-CM) - Achilles tendinitis of left lower extremity   R60.0 (ICD-10-CM) - Edema, lower extremity   M24.572 (ICD-10-CM) - Equinus contracture of left ankle   M79.672 (ICD-10-CM) - Pain in left foot   Rehab Codes: R25 pain , M25.60 stiffness, R53.1 weakness   Onset Date: 2023    Next Dr. Derek Mota: 23  Visit# / total visits:  - corrected      Cancels/No Shows: 0/0    Subjective:  Pt arrived and reports she has been adamant on stretching while at home but is not noting much improvement since start of PT. Pain:  [x] Yes  [] No Location: Lateral/posterior right ankle/achilles   Pain Rating: (0-10 scale) 6/10    Comments:    Objective:    Precautions: none    INTERVENTIONS  Reps/ Time Weight/ Level Completed  Today Comments               MODALITIES            Vasocompression - L ankle 10 min  Low; 34 deg  x Decreased pain post vaso               MANUAL            Massage stick to left gastroc/soleus/peroneals  8'      Increased tightness/tenderness present laterally  Patient is in sitting at edge of bed and bed is elevated for easier access to tender area.                 EXERCISES            Gastroc stretch @ step 3x30\"    x     Soleus stretch @ step 3x30\"    x     SB calf stretch with toes IV/EV 3x30\"  x Added 6/1    Eccentric heel raises x10   x 2 up 1 down    Towel stretch-seated 3x20\"   x     Seated heel slides 10x5\"  x    Heel/toe raises 2x10  x    Tandem balance-L back 3x30\"    Appropriate balance reactions noted   3 way hip, LLE as stance leg only  10x2  x Added 6/7  To promote ankle stability and challenge strategies    Fitter board taps DF/PF/EV/IV 10x2

## 2023-06-08 ENCOUNTER — HOSPITAL ENCOUNTER (OUTPATIENT)
Dept: PHYSICAL THERAPY | Age: 63
Setting detail: THERAPIES SERIES
Discharge: HOME | End: 2023-06-08
Payer: COMMERCIAL

## 2023-06-08 PROCEDURE — 97110 THERAPEUTIC EXERCISES: CPT

## 2023-06-08 PROCEDURE — 97016 VASOPNEUMATIC DEVICE THERAPY: CPT

## 2023-06-08 NOTE — FLOWSHEET NOTE
[x] [facility]) - [facility] & Therapy  [address]  [place]: [phone]   F: [phone]    Physical Therapy Daily Treatment Note    Date:  2023  Patient Name:  Marlo Hadley    :  1960  MRN: [id number]  Physician: Jaylen Simons DPM      Insurance: [facility]   Medical Diagnosis:   M76.62 (ICD-10-CM) - Achilles tendinitis of left lower extremity   R60.0 (ICD-10-CM) - Edema, lower extremity   M24.572 (ICD-10-CM) - Equinus contracture of left ankle   M79.672 (ICD-10-CM) - Pain in left foot   Rehab Codes: R25 pain , M25.60 stiffness, R53.1 weakness   Onset Date: 2023    Next Dr. Lynda Pratt: 23  Visit# / total visits:       Cancels/No Shows: 0/0    Subjective:  Pt arrives noting her pain is feeling better. She rates pain lower and still gets a little burning in the posterior L heel. Still a little sore at night but improves as she moves. She notes she will be going on vacation next week. Pain:  [x] Yes  [] No Location: Lateral/posterior right ankle/achilles   Pain Rating: (0-10 scale) /10    Comments:    Objective:    Precautions: none    INTERVENTIONS  Reps/ Time Weight/ Level Completed  Today Comments               MODALITIES            Vasocompression - L ankle 10 min  Low; 34 deg  x Decreased pain post vaso               MANUAL            Massage stick to left gastroc/soleus/peroneals  8'      Increased tightness/tenderness present laterally  Patient is in sitting at edge of bed and bed is elevated for easier access to tender area.                 EXERCISES            Gastroc stretch @ step 3x30\"    x     Soleus stretch @ step 3x30\"    x     SB calf stretch with toes IV/EV 2x30\"  x Added 6/1    Eccentric heel raises 2x10   x 2 up 1 down    Towel stretch-seated 3x20\"        Seated heel slides 10x5\"      Heel raises with ball btwn ankles 2x10  x    toe raises 2x10  x    Tandem balance on foam-L back 3x30\"   x Appropriate balance reactions noted   3

## 2023-06-14 ENCOUNTER — APPOINTMENT (OUTPATIENT)
Dept: PHYSICAL THERAPY | Age: 63
End: 2023-06-14
Payer: COMMERCIAL

## 2023-06-20 ENCOUNTER — TELEPHONE (OUTPATIENT)
Dept: PHYSICAL THERAPY | Age: 63
End: 2023-06-20

## 2023-06-29 ENCOUNTER — OFFICE VISIT (OUTPATIENT)
Dept: PODIATRY | Age: 63
End: 2023-06-29
Payer: COMMERCIAL

## 2023-06-29 VITALS — HEIGHT: 63 IN | WEIGHT: 241 LBS | BODY MASS INDEX: 42.7 KG/M2

## 2023-06-29 DIAGNOSIS — M76.62 ACHILLES TENDINITIS OF LEFT LOWER EXTREMITY: Primary | ICD-10-CM

## 2023-06-29 DIAGNOSIS — M79.672 PAIN IN LEFT FOOT: ICD-10-CM

## 2023-06-29 DIAGNOSIS — R60.0 EDEMA, LOWER EXTREMITY: ICD-10-CM

## 2023-06-29 DIAGNOSIS — M24.572 EQUINUS CONTRACTURE OF LEFT ANKLE: ICD-10-CM

## 2023-06-29 PROCEDURE — 99213 OFFICE O/P EST LOW 20 MIN: CPT | Performed by: PODIATRIST

## 2023-06-29 ASSESSMENT — ENCOUNTER SYMPTOMS
DIARRHEA: 0
BACK PAIN: 0
SHORTNESS OF BREATH: 0
NAUSEA: 0
COLOR CHANGE: 0

## 2023-07-06 ENCOUNTER — APPOINTMENT (OUTPATIENT)
Dept: PHYSICAL THERAPY | Age: 63
End: 2023-07-06
Payer: COMMERCIAL

## 2023-07-10 ENCOUNTER — HOSPITAL ENCOUNTER (OUTPATIENT)
Dept: PHYSICAL THERAPY | Age: 63
Setting detail: THERAPIES SERIES
Discharge: HOME | End: 2023-07-10
Payer: COMMERCIAL

## 2023-07-10 PROCEDURE — 97164 PT RE-EVAL EST PLAN CARE: CPT

## 2023-07-10 PROCEDURE — 97016 VASOPNEUMATIC DEVICE THERAPY: CPT

## 2023-07-10 PROCEDURE — 97140 MANUAL THERAPY 1/> REGIONS: CPT

## 2023-07-10 PROCEDURE — 97110 THERAPEUTIC EXERCISES: CPT

## 2023-07-10 NOTE — PROGRESS NOTES
[x] [facility]) - [facility] Therapy  [address]  [place]: [phone]   F: [phone]    Physical Therapy Daily Treatment Note/Progress Note     Date:  7/10/2023  Patient Name:  Aurelio Koo    :  1960  MRN: [id number]  Physician: Adry Barillas DPM      Insurance: [facility] Diagnosis:   M76.62 (ICD-10-CM) - Achilles tendinitis of left lower extremity   R60.0 (ICD-10-CM) - Edema, lower extremity   M24.572 (ICD-10-CM) - Equinus contracture of left ankle   M79.672 (ICD-10-CM) - Pain in left foot   Rehab Codes: R25 pain , M25.60 stiffness, R53.1 weakness   Onset Date: 2023    Next Dr. Anisa Nix: 23  Visit# / total visits:       Cancels/No Shows: 0/0  Date of initial visit: 23        Date of PN: 7/10/23 (visit 8)  Formal progress note reporting period:  23 - 7/10/23     Subjective:  Pt returns to PT after last visit being  prior to her vacation. She notes her doctor wants her to continue with PT. She notes she continues to have burning in the posterior L achilles that has gotten worse from last PT session. Notes it is burning worst at night. She is taking ibuprofen to manage. Notes she has been limited in wearing tennis shoes due to foot swelling but she was able to do  this before her vacation. Pain:  [x] Yes  [] No Location: Lateral/posterior right ankle/achilles   Pain Rating: (0-10 scale) 5/10  Comments:    Objective:    Precautions: none    INTERVENTIONS  Reps/ Time Weight/ Level Completed  Today Comments               MODALITIES            Vasocompression - L ankle 10 min  Low; 34 deg  x Decreased pain post vaso               MANUAL            Massage stick to left gastroc/soleus/peroneals  8'      Increased tightness/tenderness present laterally  Patient is in sitting at edge of bed and bed is elevated for easier access to tender area.     IASTM to L achilles  8 min  x    Manual DF stretch  3x30s  x Therapist over

## 2023-07-12 ENCOUNTER — HOSPITAL ENCOUNTER (OUTPATIENT)
Dept: PHYSICAL THERAPY | Age: 63
Setting detail: THERAPIES SERIES
Discharge: HOME | End: 2023-07-12
Payer: COMMERCIAL

## 2023-07-12 PROCEDURE — 97110 THERAPEUTIC EXERCISES: CPT

## 2023-07-12 PROCEDURE — 97140 MANUAL THERAPY 1/> REGIONS: CPT

## 2023-07-12 NOTE — FLOWSHEET NOTE
[x] [facility]) - [facility] Therapy  [address]  [place]: [phone]   F: [phone]    Physical Therapy Daily Treatment Note    Date:  2023  Patient Name:  Ashok Baird    :  1960  MRN: [id number]  Physician: Jean Marie Joseph DPM      Insurance: [employer] Diagnosis:   M76.62 (ICD-10-CM) - Achilles tendinitis of left lower extremity   R60.0 (ICD-10-CM) - Edema, lower extremity   M24.572 (ICD-10-CM) - Equinus contracture of left ankle   M79.672 (ICD-10-CM) - Pain in left foot   Rehab Codes: R25 pain , M25.60 stiffness, R53.1 weakness   Onset Date: 2023    Next Dr. Jhon Bee: 23  Visit# / total visits:       Cancels/No Shows: 0/0  Date of initial visit: 23        Date of PN: 7/10/23 (visit 8)  Formal progress note reporting period:  23 - 7/10/23     Subjective:  Pt states that she continues to experience constant discomfort in L achilles region but overall symptoms have gradually improving since PT. Was up and down the stairs quite a bit today which she feels like contributes to more discomfort. 3/10 soreness currently at start of visit. Presents to PT session in canvas slip-ons ([facility]) and states that she has had difficulty putting her foot in a regular tennis shoe due to recent swelling in B feet since her cruise. Pain:  [x] Yes  [] No Location: Lateral/posterior left ankle/achilles   Pain Rating: (0-10 scale) 3/10  Comments:    Objective:    Precautions: none    INTERVENTIONS  Reps/ Time Weight/ Level Completed  Today Comments               MODALITIES            Vasocompression - L ankle 10 min  Low; 34 deg   Decreased pain post vaso               MANUAL            Massage stick to left gastroc/soleus/peroneals  8'      Increased tightness/tenderness present laterally  Patient is in sitting at edge of bed and bed is elevated for easier access to tender area.     IASTM to L achilles and gastroc/soleus  10 min  x Sidekick tool

## 2023-07-17 ENCOUNTER — HOSPITAL ENCOUNTER (OUTPATIENT)
Dept: PHYSICAL THERAPY | Age: 63
Setting detail: THERAPIES SERIES
Discharge: HOME | End: 2023-07-17
Payer: COMMERCIAL

## 2023-07-17 PROCEDURE — 97140 MANUAL THERAPY 1/> REGIONS: CPT

## 2023-07-17 PROCEDURE — 97110 THERAPEUTIC EXERCISES: CPT

## 2023-07-17 NOTE — FLOWSHEET NOTE
[x] [facility]) - [facility] Therapy  [address]  [place]: [phone]   F: [phone]    Physical Therapy Daily Treatment Note    Date:  2023  Patient Name:  Ashok Baird    :  1960  MRN: [id number]  Physician: Jean Marie Joseph DPM      Insurance: [employer] Diagnosis:   M76.62 (ICD-10-CM) - Achilles tendinitis of left lower extremity   R60.0 (ICD-10-CM) - Edema, lower extremity   M24.572 (ICD-10-CM) - Equinus contracture of left ankle   M79.672 (ICD-10-CM) - Pain in left foot   Rehab Codes: R25 pain , M25.60 stiffness, R53.1 weakness   Onset Date: 2023    Next Dr. Jhon Bee: 23  Visit# / total visits: 10/14      Cancels/No Shows: 0/0  Date of initial visit: 23        Date of PN: 7/10/23 (visit 8)      Subjective:  Pt states pain and swelling continue to improve. States able to get shoes on today and pain has been improved since last visit. Notes  to the touch but reports improved. Continues to perform HEP daily and notes son building a step to perform stretches on/off  Pain:  [x] Yes  [] No Location: Lateral/posterior left ankle/achilles   Pain Rating: (0-10 scale) 1/10  Comments:    Objective:    Precautions: none    INTERVENTIONS  Reps/ Time Weight/ Level Completed  Today Comments               MODALITIES            Vasocompression - L ankle 10 min  Low; 34 deg   Decreased pain post vaso               MANUAL            Massage stick to left gastroc/soleus/peroneals  8'      Increased tightness/tenderness present laterally  Patient is in sitting at edge of bed and bed is elevated for easier access to tender area.     IASTM to L achilles and gastroc/soleus  10 min  x Sidekick tool in prone   Manual DF stretch  3x30s  x Therapist over pressure                EXERCISES            Gastroc stretch @ incline 3x30\"    x     Soleus stretch @ incline  3x30\"    x     SB calf stretch with toes IV/EV 2x30\"      Eccentric heel raises

## 2023-07-19 ENCOUNTER — HOSPITAL ENCOUNTER (OUTPATIENT)
Dept: PHYSICAL THERAPY | Age: 63
Setting detail: THERAPIES SERIES
Discharge: HOME | End: 2023-07-19
Payer: COMMERCIAL

## 2023-07-19 PROCEDURE — 97140 MANUAL THERAPY 1/> REGIONS: CPT

## 2023-07-19 PROCEDURE — 97110 THERAPEUTIC EXERCISES: CPT

## 2023-07-19 NOTE — FLOWSHEET NOTE
[x] [address] [facility] Therapy  [address]  [place]: [phone]   F: [phone]    Physical Therapy Daily Treatment Note    Date:  2023  Patient Name:  Marv Bates    :  1960  MRN: [id number]  Physician: Rene Lagunas DPM      Insurance: [employer] Diagnosis:   M76.62 (ICD-10-CM) - Achilles tendinitis of left lower extremity   R60.0 (ICD-10-CM) - Edema, lower extremity   M24.572 (ICD-10-CM) - Equinus contracture of left ankle   M79.672 (ICD-10-CM) - Pain in left foot   Rehab Codes: R25 pain , M25.60 stiffness, R53.1 weakness   Onset Date: 2023    Next Dr. Darling Bendin23  Visit# / total visits:       Cancels/No Shows: 0/0  Date of initial visit: 23        Date of PN: 7/10/23 (visit 8)    Subjective:  Pt states feeling good with minimal pain at this time. States able to get regular shoes on without issues. Still painful first thing in the morning and then in the evenings after being active all day. Pain:  [x] Yes  [] No Location: Lateral/posterior left ankle/achilles   Pain Rating: (0-10 scale) 1/10  Comments:    Objective:    Precautions: none    INTERVENTIONS  Reps/ Time Weight/ Level Completed  Today Comments               MODALITIES            Vasocompression - L ankle 10 min  Low; 34 deg   Decreased pain post vaso               MANUAL            Massage stick to left gastroc/soleus/peroneals  8'      Increased tightness/tenderness present laterally  Patient is in sitting at edge of bed and bed is elevated for easier access to tender area.     IASTM to L achilles and gastroc/soleus  10 min  x Sidekick tool in prone   Manual DF stretch  3x30s  x Therapist over pressure                EXERCISES            Gastroc stretch @ incline 3x30\"    x     Soleus stretch @ incline  3x30\"    x     SB calf stretch with toes IV/EV 2x30\"  x    Eccentric heel raises 3x10   x 2 up 1 down- 3'' lower  Light UE support   Towel stretch-seated

## 2023-07-24 ENCOUNTER — OFFICE VISIT (OUTPATIENT)
Dept: ORTHOPEDIC SURGERY | Age: 63
End: 2023-07-24
Payer: COMMERCIAL

## 2023-07-24 ENCOUNTER — HOSPITAL ENCOUNTER (OUTPATIENT)
Dept: PHYSICAL THERAPY | Age: 63
Setting detail: THERAPIES SERIES
Discharge: HOME | End: 2023-07-24
Payer: COMMERCIAL

## 2023-07-24 DIAGNOSIS — M65.342 TRIGGER RING FINGER OF LEFT HAND: Primary | ICD-10-CM

## 2023-07-24 PROCEDURE — 99203 OFFICE O/P NEW LOW 30 MIN: CPT | Performed by: ORTHOPAEDIC SURGERY

## 2023-07-24 SDOH — HEALTH STABILITY: PHYSICAL HEALTH: ON AVERAGE, HOW MANY DAYS PER WEEK DO YOU ENGAGE IN MODERATE TO STRENUOUS EXERCISE (LIKE A BRISK WALK)?: 5 DAYS

## 2023-07-24 SDOH — HEALTH STABILITY: PHYSICAL HEALTH: ON AVERAGE, HOW MANY MINUTES DO YOU ENGAGE IN EXERCISE AT THIS LEVEL?: 10 MIN

## 2023-07-24 NOTE — CARE COORDINATION
[] [facility] & Therapy  [address]  [place]: [phone]   F: [phone]     Physical Therapy Cancel/No Show note    Date: 2023  Patient:  Madi Aiken  : 1960  MRN: [id number]    Visit Count:   Cancels/No Shows to date:     For today's appointment patient:    [x]  Cancelled    [] Rescheduled appointment    [] No-show     Reason given by patient:    []  Patient ill    []  Conflicting appointment    [] No transportation      [] Conflict with work    [] No reason given    [] Weather related    [] COVID-19    [x] Other:  Patient just had an injection and is unable to make it to PT    Comments:        [x] Next appointment was confirmed    Electronically signed by: Buck Powers PT

## 2023-07-24 NOTE — PROGRESS NOTES
Social History     Socioeconomic History    Marital status:      Spouse name: Not on file    Number of children: Not on file    Years of education: Not on file    Highest education level: Not on file   Occupational History    Not on file   Tobacco Use    Smoking status: Never    Smokeless tobacco: Never   Vaping Use    Vaping Use: Never used   Substance and Sexual Activity    Alcohol use: Yes     Comment: only while on vacation    Drug use: Never    Sexual activity: Not on file   Other Topics Concern    Not on file   Social History Narrative    Not on file     Social Determinants of Health     Financial Resource Strain: Not on file   Food Insecurity: Not on file   Transportation Needs: Not on file   Physical Activity: Insufficiently Active    Days of Exercise per Week: 5 days    Minutes of Exercise per Session: 10 min   Stress: Not on file   Social Connections: Not on file   Intimate Partner Violence: Not At Risk    Fear of Current or Ex-Partner: No    Emotionally Abused: No    Physically Abused: No    Sexually Abused: No   Housing Stability: Not on file     Past Medical History:   Diagnosis Date    Type 2 diabetes mellitus ([address]) 2002     Past Surgical History:   Procedure Laterality Date    PARTIAL HYSTERECTOMY (CERVIX NOT REMOVED) N/A 2006    TONSILLECTOMY       No family history on file. Plan  Patient wishes to have this release that she has had releases before in the past.  She says he never had relief with any injections we will get her scheduled accordingly    Provider Attestation:  Hiren Oscar, personally performed the services described in this documentation. All medical record entries made by the scribe were at my direction and in my presence. I have reviewed the chart and discharge instructions and agree that the records reflect my personal performance and is accurate and complete.  Bladimir Stone MD. 07/24/23      Please note that this chart was generated using voice recognition

## 2023-07-26 ENCOUNTER — HOSPITAL ENCOUNTER (OUTPATIENT)
Dept: PHYSICAL THERAPY | Age: 63
Setting detail: THERAPIES SERIES
Discharge: HOME | End: 2023-07-26
Payer: COMMERCIAL

## 2023-07-26 PROCEDURE — 97140 MANUAL THERAPY 1/> REGIONS: CPT

## 2023-07-26 PROCEDURE — 97110 THERAPEUTIC EXERCISES: CPT

## 2023-07-26 NOTE — FLOWSHEET NOTE
[x] [facility]) - [facility] Therapy  [address]  [place]: [phone]   F: [phone]    Physical Therapy Daily Treatment Note    Date:  2023  Patient Name:  Ashok Baird    :  1960  MRN: [id number]  Physician: Jean Marie Joseph DPM      Insurance: [employer] [facility] Diagnosis:   M76.62 (ICD-10-CM) - Achilles tendinitis of left lower extremity   R60.0 (ICD-10-CM) - Edema, lower extremity   M24.572 (ICD-10-CM) - Equinus contracture of left ankle   M79.672 (ICD-10-CM) - Pain in left foot   Rehab Codes: R25 pain , M25.60 stiffness, R53.1 weakness   Onset Date: 2023    Next  [address] [place] [address] [place]: 23  Visit# / total visits:       Cancels/No Shows: 0/0  Date of initial visit: 23        Date of PN: 7/10/23 (visit 8)    Subjective:  Pt states increased pain in achilles has been worse the past few days due to falling in the tub on Monday. States increased pain with rainy weather also. Denies any issues after last therapy session. Pain:  [x] Yes  [] No Location: Lateral/posterior left ankle/achilles   Pain Rating: (0-10 scale) 4/10  Comments:    Objective:    Precautions: none    INTERVENTIONS  Reps/ Time Weight/ Level Completed  Today Comments               MODALITIES            Vasocompression - L ankle 10 min  Low; 34 deg   Decreased pain post vaso               MANUAL            Massage stick to left gastroc/soleus/peroneals  8'      Increased tightness/tenderness present laterally  Patient is in sitting at edge of bed and bed is elevated for easier access to tender area.     IASTM to L achilles and gastroc/soleus  10 min  x Sidekick tool in prone   Manual DF stretch  3x30s  x Therapist over pressure                EXERCISES            Gastroc stretch @ incline 3x30\"    x     Soleus stretch @ incline  3x30\"    x     SB calf stretch with toes IV/EV 2x30\"  x    Eccentric heel raises 3x10   x 2 up 1 down- 3'' lower  Light UE support   Towel stretch-seated

## 2023-07-27 ENCOUNTER — OFFICE VISIT (OUTPATIENT)
Dept: PODIATRY | Age: 63
End: 2023-07-27
Payer: COMMERCIAL

## 2023-07-27 VITALS — HEIGHT: 63 IN | BODY MASS INDEX: 42.52 KG/M2 | WEIGHT: 240 LBS

## 2023-07-27 DIAGNOSIS — R60.0 EDEMA, LOWER EXTREMITY: ICD-10-CM

## 2023-07-27 DIAGNOSIS — M24.572 EQUINUS CONTRACTURE OF LEFT ANKLE: ICD-10-CM

## 2023-07-27 DIAGNOSIS — M79.672 PAIN IN LEFT FOOT: ICD-10-CM

## 2023-07-27 DIAGNOSIS — M76.62 ACHILLES TENDINITIS OF LEFT LOWER EXTREMITY: Primary | ICD-10-CM

## 2023-07-27 PROCEDURE — 99213 OFFICE O/P EST LOW 20 MIN: CPT | Performed by: PODIATRIST

## 2023-07-27 ASSESSMENT — ENCOUNTER SYMPTOMS
BACK PAIN: 0
SHORTNESS OF BREATH: 0
NAUSEA: 0
DIARRHEA: 0
COLOR CHANGE: 0

## 2023-07-27 NOTE — PROGRESS NOTES
[facility]  Return Patient Progress Note    Subjective: Marv Bates 61 y.o. female that presents for follow up evaluation of Achilles tendinitis and equinus left. Chief Complaint   Patient presents with    Foot Pain     Left foot fup-improving    Patient's treatment thus far has included physical therapy. Pain is rated 2 out of 10 and is described as intermittent. Patient has been following my prescribed course of therapy as instructed. Patient states that her pain is not limited to the back of the heel only. Review of Systems   Constitutional:  Negative for activity change, appetite change, chills, diaphoresis, fatigue and fever. Respiratory:  Negative for shortness of breath. Cardiovascular:  Negative for leg swelling. Gastrointestinal:  Negative for diarrhea and nausea. Endocrine: Negative for cold intolerance, heat intolerance and polyuria. Musculoskeletal:  Positive for arthralgias. Negative for back pain, gait problem, joint swelling and myalgias. Skin:  Negative for color change, pallor, rash and wound. Allergic/Immunologic: Negative for environmental allergies and food allergies. Neurological:  Negative for dizziness, weakness, light-headedness and numbness. Hematological:  Does not bruise/bleed easily. Psychiatric/Behavioral:  Negative for behavioral problems, confusion and self-injury. The patient is not nervous/anxious. Objective: Clinical evaluation of the patient reveals continued soft tissue resistance upon passive dorsiflexion of the bilateral ankles with the knee(s) extended. However, this soft tissue resistance is not present with the knee(s) flexed. Further, this soft tissue resistance has decreased in severity since last visit. There remains pain with palpation to the posterior aspect of the left heel at the insertion of the Achilles tendon. There is no pain today with palpation to the watershed region of the Achilles tendon left lower extremity.

## 2023-07-31 ENCOUNTER — HOSPITAL ENCOUNTER (OUTPATIENT)
Dept: PHYSICAL THERAPY | Age: 63
Setting detail: THERAPIES SERIES
Discharge: HOME | End: 2023-07-31
Payer: COMMERCIAL

## 2023-07-31 NOTE — FLOWSHEET NOTE
[x] [facility] & Therapy  [address]  [place]: [phone]   F: [phone]     Physical Therapy Cancel/No Show note    Date: 2023  Patient: Mallory Given  : 1960  MRN: [id number]    Visit Count:   Cancels/No Shows to date:     For today's appointment patient:    [x]  Cancelled    [] Rescheduled appointment    [] No-show     Reason given by patient:    []  Patient ill    []  Conflicting appointment    [] No transportation      [] Conflict with work    [] No reason given    [] Weather related    [] COVID-19    [x] Other:      Comments: Patient cancelled due to not having insurance and wants to be [facility]. Spoke with doctor and said ok to be DC.      [] Next appointment was confirmed    Electronically signed by: Kathie Gold PTA          [] [facility]) @ [facility]  [address] [facility], [address] [place] [address] [address]  Phone [phone]  Fax [phone]    Physical Therapy Discharge Note    Date: 2023      Patient: Mallory Given  : 1960  MRN: [id number]    Physician: Bria Hartman DPM                                      Insurance: [facility]   Medical Diagnosis:   J32.78 (ICD-10-CM) - Achilles tendinitis of left lower extremity   R60.0 (ICD-10-CM) - Edema, lower extremity   M24.572 (ICD-10-CM) - Equinus contracture of left ankle   M79.672 (ICD-10-CM) - Pain in left foot   Rehab Codes: R25 pain , M25.60 stiffness, R53.1 weakness           Onset Date: 2023                             Next Dr. Jayme Shafer: 23  Visit# / total visits:                                 Cancels/No Shows: 0/0  Date of initial visit: 23        Date of PN: 7/10/23 (visit 8)      As noted above pt needing to cancel PT appt due to loss of insurance at this time. She has cleared with doctor. Please see note  for last attended visit.                 Treatment Included:     [x] Therapeutic Exercise   [id number]  []

## 2023-08-07 ENCOUNTER — HOSPITAL ENCOUNTER (OUTPATIENT)
Dept: PREADMISSION TESTING | Age: 63
Discharge: HOME | End: 2023-08-07

## 2024-07-25 ENCOUNTER — HOSPITAL ENCOUNTER (OUTPATIENT)
Age: 64
Setting detail: SPECIMEN
Discharge: HOME | End: 2024-07-25

## 2024-07-25 DIAGNOSIS — R60.0 EDEMA OF RIGHT LOWER EXTREMITY: ICD-10-CM

## 2024-07-25 LAB
ANION GAP SERPL CALCULATED.3IONS-SCNC: 12 MMOL/L (ref 9–16)
BNP SERPL-MCNC: <36 PG/ML (ref 0–300)
BUN SERPL-MCNC: 20 MG/DL (ref 8–23)
CALCIUM SERPL-MCNC: 9.9 MG/DL (ref 8.6–10.4)
CHLORIDE SERPL-SCNC: 103 MMOL/L (ref 98–107)
CO2 SERPL-SCNC: 24 MMOL/L (ref 20–31)
CREAT SERPL-MCNC: 0.7 MG/DL (ref 0.5–0.9)
GFR, ESTIMATED: >90 ML/MIN/1.73M2
GLUCOSE SERPL-MCNC: 117 MG/DL (ref 74–99)
POTASSIUM SERPL-SCNC: 4.5 MMOL/L (ref 3.7–5.3)
SODIUM SERPL-SCNC: 139 MMOL/L (ref 136–145)

## 2024-07-31 ENCOUNTER — HOSPITAL ENCOUNTER (OUTPATIENT)
Age: 64
Discharge: HOME | End: 2024-08-02
Attending: FAMILY MEDICINE
Payer: COMMERCIAL

## 2024-07-31 DIAGNOSIS — R06.02 SHORTNESS OF BREATH: ICD-10-CM

## 2024-07-31 LAB
ECHO AO ROOT DIAM: 2.4 CM
ECHO AV AREA PEAK VELOCITY: 1.4 CM2
ECHO AV AREA VTI: 1.7 CM2
ECHO AV MEAN GRADIENT: 5 MMHG
ECHO AV MEAN VELOCITY: 1 M/S
ECHO AV PEAK GRADIENT: 7 MMHG
ECHO AV PEAK VELOCITY: 1.4 M/S
ECHO AV VELOCITY RATIO: 0.64
ECHO AV VTI: 33.3 CM
ECHO EST RA PRESSURE: 3 MMHG
ECHO LA AREA 2C: 16.8 CM2
ECHO LA AREA 4C: 13.7 CM2
ECHO LA DIAMETER: 3.4 CM
ECHO LA MAJOR AXIS: 5.5 CM
ECHO LA MINOR AXIS: 5.6 CM
ECHO LA TO AORTIC ROOT RATIO: 1.42
ECHO LA VOL BP: 34 ML (ref 22–52)
ECHO LA VOL MOD A2C: 41 ML (ref 22–52)
ECHO LA VOL MOD A4C: 28 ML (ref 22–52)
ECHO LV E' LATERAL VELOCITY: 8 CM/S
ECHO LV E' SEPTAL VELOCITY: 6 CM/S
ECHO LV FRACTIONAL SHORTENING: 38 % (ref 28–44)
ECHO LV INTERNAL DIMENSION DIASTOLIC: 4.5 CM (ref 3.9–5.3)
ECHO LV INTERNAL DIMENSION SYSTOLIC: 2.8 CM
ECHO LV IVSD: 1.3 CM (ref 0.6–0.9)
ECHO LV MASS 2D: 222.6 G (ref 67–162)
ECHO LV POSTERIOR WALL DIASTOLIC: 1.3 CM (ref 0.6–0.9)
ECHO LV RELATIVE WALL THICKNESS RATIO: 0.58
ECHO LVOT AREA: 2.3 CM2
ECHO LVOT AV VTI INDEX: 0.73
ECHO LVOT DIAM: 1.7 CM
ECHO LVOT MEAN GRADIENT: 2 MMHG
ECHO LVOT PEAK GRADIENT: 3 MMHG
ECHO LVOT PEAK VELOCITY: 0.9 M/S
ECHO LVOT SV: 54.9 ML
ECHO LVOT VTI: 24.2 CM
ECHO MV A VELOCITY: 0.94 M/S
ECHO MV AREA VTI: 1.6 CM2
ECHO MV E DECELERATION TIME (DT): 343 MS
ECHO MV E VELOCITY: 0.63 M/S
ECHO MV E/A RATIO: 0.67
ECHO MV E/E' LATERAL: 7.88
ECHO MV E/E' RATIO (AVERAGED): 9.19
ECHO MV E/E' SEPTAL: 10.5
ECHO MV LVOT VTI INDEX: 1.43
ECHO MV MAX VELOCITY: 1.2 M/S
ECHO MV MEAN GRADIENT: 1 MMHG
ECHO MV MEAN VELOCITY: 0.6 M/S
ECHO MV PEAK GRADIENT: 6 MMHG
ECHO MV VTI: 34.7 CM
ECHO RA AREA 4C: 10.3 CM2
ECHO RA VOLUME: 20 ML
ECHO RIGHT VENTRICULAR SYSTOLIC PRESSURE (RVSP): 22 MMHG
ECHO RV TAPSE: 2.1 CM (ref 1.7–?)
ECHO TV REGURGITANT MAX VELOCITY: 2.17 M/S
ECHO TV REGURGITANT PEAK GRADIENT: 19 MMHG

## 2024-07-31 PROCEDURE — 93306 TTE W/DOPPLER COMPLETE: CPT | Performed by: INTERNAL MEDICINE

## 2024-07-31 PROCEDURE — 93306 TTE W/DOPPLER COMPLETE: CPT

## 2024-11-25 ENCOUNTER — HOSPITAL ENCOUNTER (OUTPATIENT)
Age: 64
Setting detail: SPECIMEN
Discharge: HOME | End: 2024-11-25

## 2024-11-25 DIAGNOSIS — R30.0 DYSURIA: ICD-10-CM

## 2024-11-26 LAB
BACTERIA URNS QL MICRO: ABNORMAL
BILIRUB UR QL STRIP: NEGATIVE
CASTS #/AREA URNS LPF: ABNORMAL /LPF (ref 0–8)
CLARITY UR: ABNORMAL
COLOR UR: YELLOW
EPI CELLS #/AREA URNS HPF: ABNORMAL /HPF (ref 0–5)
GLUCOSE UR STRIP-MCNC: NEGATIVE MG/DL
HGB UR QL STRIP.AUTO: ABNORMAL
KETONES UR STRIP-MCNC: NEGATIVE MG/DL
LEUKOCYTE ESTERASE UR QL STRIP: NEGATIVE
NITRITE UR QL STRIP: NEGATIVE
PH UR STRIP: 6.5 [PH] (ref 5–8)
PROT UR STRIP-MCNC: ABNORMAL MG/DL
RBC #/AREA URNS HPF: ABNORMAL /HPF (ref 0–4)
SP GR UR STRIP: 1.03 (ref 1–1.03)
UROBILINOGEN UR STRIP-ACNC: NORMAL EU/DL (ref 0–1)
WBC #/AREA URNS HPF: ABNORMAL /HPF (ref 0–5)

## 2024-11-28 LAB
MICROORGANISM SPEC CULT: ABNORMAL
SPECIMEN DESCRIPTION: ABNORMAL

## 2024-11-29 NOTE — RESULT ENCOUNTER NOTE
Jennifer - your urine culture grow a small amount of bacteria that should be taken care of by the antibiotic I ordered for you. Are you feeling better?

## 2025-03-04 ENCOUNTER — HOSPITAL ENCOUNTER (OUTPATIENT)
Age: 65
Setting detail: SPECIMEN
Discharge: HOME | End: 2025-03-04

## 2025-03-04 ENCOUNTER — HOSPITAL ENCOUNTER (OUTPATIENT)
Dept: GENERAL RADIOLOGY | Facility: CLINIC | Age: 65
Discharge: HOME | End: 2025-03-06
Payer: COMMERCIAL

## 2025-03-04 ENCOUNTER — HOSPITAL ENCOUNTER (OUTPATIENT)
Facility: CLINIC | Age: 65
Discharge: HOME | End: 2025-03-06
Payer: COMMERCIAL

## 2025-03-04 DIAGNOSIS — E11.9 TYPE 2 DIABETES MELLITUS WITHOUT COMPLICATION, WITH LONG-TERM CURRENT USE OF INSULIN (HCC): ICD-10-CM

## 2025-03-04 DIAGNOSIS — K59.01 SLOW TRANSIT CONSTIPATION: ICD-10-CM

## 2025-03-04 DIAGNOSIS — Z79.4 TYPE 2 DIABETES MELLITUS WITHOUT COMPLICATION, WITH LONG-TERM CURRENT USE OF INSULIN (HCC): ICD-10-CM

## 2025-03-04 LAB
ALBUMIN SERPL-MCNC: 4.4 G/DL (ref 3.5–5.2)
ALBUMIN/GLOB SERPL: 1.3 {RATIO} (ref 1–2.5)
ALP SERPL-CCNC: 58 U/L (ref 35–104)
ALT SERPL-CCNC: 39 U/L (ref 10–35)
ANION GAP SERPL CALCULATED.3IONS-SCNC: 13 MMOL/L (ref 9–16)
AST SERPL-CCNC: 40 U/L (ref 10–35)
BASOPHILS # BLD: 0.11 K/UL (ref 0–0.2)
BASOPHILS NFR BLD: 1 % (ref 0–2)
BILIRUB SERPL-MCNC: 0.3 MG/DL (ref 0–1.2)
BUN SERPL-MCNC: 16 MG/DL (ref 8–23)
CALCIUM SERPL-MCNC: 9.8 MG/DL (ref 8.6–10.4)
CHLORIDE SERPL-SCNC: 104 MMOL/L (ref 98–107)
CHOLEST SERPL-MCNC: 159 MG/DL (ref 0–199)
CHOLESTEROL/HDL RATIO: 3.5
CO2 SERPL-SCNC: 24 MMOL/L (ref 20–31)
CREAT SERPL-MCNC: 0.7 MG/DL (ref 0.6–0.9)
CREAT UR-MCNC: 120 MG/DL (ref 28–217)
EOSINOPHIL # BLD: 0.09 K/UL (ref 0–0.44)
EOSINOPHILS RELATIVE PERCENT: 1 % (ref 1–4)
ERYTHROCYTE [DISTWIDTH] IN BLOOD BY AUTOMATED COUNT: 13.7 % (ref 11.8–14.4)
GFR, ESTIMATED: >90 ML/MIN/1.73M2
GLUCOSE SERPL-MCNC: 95 MG/DL (ref 74–99)
HCT VFR BLD AUTO: 43.4 % (ref 36.3–47.1)
HDLC SERPL-MCNC: 45 MG/DL
HGB BLD-MCNC: 13.4 G/DL (ref 11.9–15.1)
IMM GRANULOCYTES # BLD AUTO: <0.03 K/UL (ref 0–0.3)
IMM GRANULOCYTES NFR BLD: 0 %
LDLC SERPL CALC-MCNC: 93 MG/DL (ref 0–100)
LYMPHOCYTES NFR BLD: 3.16 K/UL (ref 1.1–3.7)
LYMPHOCYTES RELATIVE PERCENT: 41 % (ref 24–43)
MCH RBC QN AUTO: 29.1 PG (ref 25.2–33.5)
MCHC RBC AUTO-ENTMCNC: 30.9 G/DL (ref 28.4–34.8)
MCV RBC AUTO: 94.3 FL (ref 82.6–102.9)
MICROALBUMIN UR-MCNC: 46 MG/L (ref 0–20)
MICROALBUMIN/CREAT UR-RTO: 38 MCG/MG CREAT (ref 0–25)
MONOCYTES NFR BLD: 0.37 K/UL (ref 0.1–1.2)
MONOCYTES NFR BLD: 5 % (ref 3–12)
NEUTROPHILS NFR BLD: 52 % (ref 36–65)
NEUTS SEG NFR BLD: 3.93 K/UL (ref 1.5–8.1)
NRBC BLD-RTO: 0 PER 100 WBC
PLATELET # BLD AUTO: 242 K/UL (ref 138–453)
PMV BLD AUTO: 10.7 FL (ref 8.1–13.5)
POTASSIUM SERPL-SCNC: 4.7 MMOL/L (ref 3.7–5.3)
PROT SERPL-MCNC: 7.7 G/DL (ref 6.6–8.7)
RBC # BLD AUTO: 4.6 M/UL (ref 3.95–5.11)
SODIUM SERPL-SCNC: 141 MMOL/L (ref 136–145)
TRIGL SERPL-MCNC: 104 MG/DL
VLDLC SERPL CALC-MCNC: 21 MG/DL (ref 1–30)
WBC OTHER # BLD: 7.7 K/UL (ref 3.5–11.3)

## 2025-03-04 PROCEDURE — 74018 RADEX ABDOMEN 1 VIEW: CPT

## 2025-03-06 NOTE — RESULT ENCOUNTER NOTE
Jennifer - your xray shows no obstruction in your intestine. Increase your fiber as we discussed at your appointment.